# Patient Record
Sex: FEMALE | URBAN - METROPOLITAN AREA
[De-identification: names, ages, dates, MRNs, and addresses within clinical notes are randomized per-mention and may not be internally consistent; named-entity substitution may affect disease eponyms.]

---

## 2023-09-11 ENCOUNTER — APPOINTMENT (OUTPATIENT)
Dept: OTHER | Facility: HOSPITAL | Age: 28
End: 2023-09-11
Attending: PREVENTIVE MEDICINE

## 2023-11-02 ENCOUNTER — OFFICE VISIT (OUTPATIENT)
Dept: FAMILY MEDICINE CLINIC | Facility: CLINIC | Age: 28
End: 2023-11-02
Payer: COMMERCIAL

## 2023-11-02 DIAGNOSIS — E28.2 PCOS (POLYCYSTIC OVARIAN SYNDROME): ICD-10-CM

## 2023-11-02 DIAGNOSIS — E66.01 CLASS 3 SEVERE OBESITY DUE TO EXCESS CALORIES WITHOUT SERIOUS COMORBIDITY WITH BODY MASS INDEX (BMI) OF 45.0 TO 49.9 IN ADULT (HCC): ICD-10-CM

## 2023-11-02 DIAGNOSIS — Z00.00 ROUTINE GENERAL MEDICAL EXAMINATION AT A HEALTH CARE FACILITY: Primary | ICD-10-CM

## 2023-11-02 DIAGNOSIS — Z23 NEED FOR VACCINATION: ICD-10-CM

## 2023-11-02 PROCEDURE — 90471 IMMUNIZATION ADMIN: CPT | Performed by: PHYSICIAN ASSISTANT

## 2023-11-02 PROCEDURE — 90686 IIV4 VACC NO PRSV 0.5 ML IM: CPT | Performed by: PHYSICIAN ASSISTANT

## 2023-11-02 PROCEDURE — 3079F DIAST BP 80-89 MM HG: CPT | Performed by: PHYSICIAN ASSISTANT

## 2023-11-02 PROCEDURE — 3074F SYST BP LT 130 MM HG: CPT | Performed by: PHYSICIAN ASSISTANT

## 2023-11-02 PROCEDURE — 99385 PREV VISIT NEW AGE 18-39: CPT | Performed by: PHYSICIAN ASSISTANT

## 2023-11-02 PROCEDURE — 3008F BODY MASS INDEX DOCD: CPT | Performed by: PHYSICIAN ASSISTANT

## 2023-11-02 NOTE — PROGRESS NOTES
Subjective:   Patient ID: Cesia Narayan is a 33year old female. HPI  Patient presents today to establish care and needs physical.     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 HENT:  Negative for congestion, ear pain, rhinorrhea and sore throat. Eyes:  Negative for visual disturbance. Respiratory:  Negative for cough, shortness of breath and wheezing. Cardiovascular:  Negative for chest pain, palpitations and leg swelling. Gastrointestinal:  Negative for abdominal pain, diarrhea, nausea and vomiting. Genitourinary:  Positive for menstrual problem (PCOS, irregular menses). Negative for dysuria, frequency and hematuria. Musculoskeletal:  Negative for arthralgias, gait problem and myalgias. Skin:  Negative for rash. Neurological:  Negative for weakness, light-headedness and headaches. Hematological:  Negative for adenopathy. Psychiatric/Behavioral:  Negative for dysphoric mood. The patient is not nervous/anxious. No current outpatient medications on file.      Allergies:Not on File    Objective:   Physical Exam  Vitals and nursing note reviewed. Constitutional:       General: She is not in acute distress. Appearance: Normal appearance. She is well-developed. HENT:      Head: Normocephalic and atraumatic. Right Ear: Tympanic membrane, ear canal and external ear normal.      Left Ear: Tympanic membrane, ear canal and external ear normal.      Nose: Nose normal.      Mouth/Throat:      Mouth: Mucous membranes are moist.   Eyes:      Extraocular Movements: Extraocular movements intact. Conjunctiva/sclera: Conjunctivae normal.      Pupils: Pupils are equal, round, and reactive to light. Neck:      Thyroid: No thyromegaly. Cardiovascular:      Rate and Rhythm: Normal rate and regular rhythm. Pulses: Normal pulses. Heart sounds: Normal heart sounds. Pulmonary:      Effort: Pulmonary effort is normal.      Breath sounds: Normal breath sounds. No wheezing or rales. Abdominal:      General: Bowel sounds are normal. There is no distension. Palpations: Abdomen is soft. There is no mass. Tenderness: There is no abdominal tenderness. Musculoskeletal:         General: No tenderness. Normal range of motion. Cervical back: Normal range of motion and neck supple. Lymphadenopathy:      Cervical: No cervical adenopathy. Skin:     General: Skin is warm and dry. Findings: No rash. Neurological:      General: No focal deficit present. Mental Status: She is alert and oriented to person, place, and time. Psychiatric:         Mood and Affect: Mood normal.         Behavior: Behavior normal.         Assessment & Plan:   1. Routine general medical examination at a health care facility  Patient here with active issues as below. Check fasting labs. Health maintenance issues discussed. Encouraged routine vaccines. Advised healthy diet and regular exercise. Annual physicals. - CBC With Differential With Platelet; Future  - Comp Metabolic Panel (14); Future  - Lipid Panel;  Future  - Vitamin B12; Future  - VITAMIN D, 25-HYDROXY [08052][Q]; Future  - HGB A1C [496] [Q]  - TSH and Free T4 [E]  - CBC With Differential With Platelet  - Comp Metabolic Panel (14)  - Lipid Panel  - Vitamin B12  - VITAMIN D, 25-HYDROXY [77132][Q]    2. PCOS (polycystic ovarian syndrome)  Check labs. Follow up pending results to discuss treatment. - TESTOSTERONE (FREE) [30970] [Q]  - TESTOSTERONE TOTAL [873] [Q]  - ESTRADIOL [4021] [Q]  - FSH AND LH [5737] [Q]    3. Need for vaccination  - Fluzone Quadrivalent 6mo and older, 0.5mL    4. Class 3 severe obesity due to excess calories without serious comorbidity with body mass index (BMI) of 45.0 to 49.9 in adult [facility])  Check labs as above. We discussed potentially using GLP-1 or oral medications. Will follow up after labs to discuss options further.

## 2023-11-03 VITALS
BODY MASS INDEX: 48.82 KG/M2 | OXYGEN SATURATION: 98 % | DIASTOLIC BLOOD PRESSURE: 88 MMHG | RESPIRATION RATE: 16 BRPM | WEIGHT: 293 LBS | HEART RATE: 105 BPM | HEIGHT: 65 IN | SYSTOLIC BLOOD PRESSURE: 128 MMHG

## 2023-11-07 LAB
ABSOLUTE BASOPHILS: 27 CELLS/UL (ref 0–200)
ABSOLUTE EOSINOPHILS: 342 CELLS/UL (ref 15–500)
ABSOLUTE LYMPHOCYTES: 3636 CELLS/UL (ref 850–3900)
ABSOLUTE MONOCYTES: 378 CELLS/UL (ref 200–950)
ABSOLUTE NEUTROPHILS: 4617 CELLS/UL (ref 1500–7800)
ALBUMIN/GLOBULIN RATIO: 1.2 (CALC) (ref 1–2.5)
ALBUMIN: 3.9 G/DL (ref 3.6–5.1)
ALBUMIN: 3.9 G/DL (ref 3.6–5.1)
ALKALINE PHOSPHATASE: 88 U/L (ref 31–125)
ALT: 51 U/L (ref 6–29)
AST: 30 U/L (ref 10–30)
BASOPHILS: 0.3 %
BILIRUBIN, TOTAL: 0.4 MG/DL (ref 0.2–1.2)
BUN: 9 MG/DL (ref 7–25)
CALCIUM: 9 MG/DL (ref 8.6–10.2)
CARBON DIOXIDE: 28 MMOL/L (ref 20–32)
CHLORIDE: 104 MMOL/L (ref 98–110)
CHOL/HDLC RATIO: 4.2 (CALC)
CHOLESTEROL, TOTAL: 163 MG/DL
CREATININE: 0.62 MG/DL (ref 0.5–0.96)
EGFR: 124 ML/MIN/1.73M2
EOSINOPHILS: 3.8 %
ESTRADIOL: 85 PG/ML
FSH: 4.8 MIU/ML
GLOBULIN: 3.2 G/DL (CALC) (ref 1.9–3.7)
GLUCOSE: 86 MG/DL (ref 65–99)
HDL CHOLESTEROL: 39 MG/DL
HEMATOCRIT: 41.9 % (ref 35–45)
HEMOGLOBIN A1C: 5.1 % OF TOTAL HGB
HEMOGLOBIN: 13.9 G/DL (ref 11.7–15.5)
LDL-CHOLESTEROL: 93 MG/DL (CALC)
LH: 7.9 MIU/ML
LYMPHOCYTES: 40.4 %
MCH: 28.4 PG (ref 27–33)
MCHC: 33.2 G/DL (ref 32–36)
MCV: 85.5 FL (ref 80–100)
MONOCYTES: 4.2 %
MPV: 10.7 FL (ref 7.5–12.5)
NEUTROPHILS: 51.3 %
NON-HDL CHOLESTEROL: 124 MG/DL (CALC)
PLATELET COUNT: 312 THOUSAND/UL (ref 140–400)
POTASSIUM: 4.3 MMOL/L (ref 3.5–5.3)
PROTEIN, TOTAL: 7.1 G/DL (ref 6.1–8.1)
RDW: 13.1 % (ref 11–15)
RED BLOOD CELL COUNT: 4.9 MILLION/UL (ref 3.8–5.1)
SEX HORMONE BINDING$GLOBULIN: 35.3 NMOL/L (ref 17–124)
SODIUM: 138 MMOL/L (ref 135–146)
T4, FREE: 1.2 NG/DL (ref 0.8–1.8)
TESTOSTERONE, FREE: 4.7 PG/ML (ref 0.2–5)
TESTOSTERONE, TOTAL,$/MS/MS: 41 NG/DL (ref 2–45)
TESTOSTERONE,BIOAVAILABLE: 8.4 NG/DL (ref 0.5–8.5)
TRIGLYCERIDES: 212 MG/DL
TSH: 0.9 MIU/L
VITAMIN B12: 307 PG/ML (ref 200–1100)
VITAMIN D, 25-OH, TOTAL: 7 NG/ML (ref 30–100)
WHITE BLOOD CELL COUNT: 9 THOUSAND/UL (ref 3.8–10.8)

## 2023-11-20 ENCOUNTER — TELEPHONE (OUTPATIENT)
Dept: FAMILY MEDICINE CLINIC | Facility: CLINIC | Age: 28
End: 2023-11-20

## 2023-11-20 NOTE — TELEPHONE ENCOUNTER
Tirzepatide (MOUNJARO) 2.5 MG/0.5ML Subcutaneous Solution Pen-injector       KEY [id number]          FAX IN PA TRIAGE FOLDER

## 2023-12-07 ENCOUNTER — PATIENT MESSAGE (OUTPATIENT)
Dept: FAMILY MEDICINE CLINIC | Facility: CLINIC | Age: 28
End: 2023-12-07

## 2023-12-07 NOTE — TELEPHONE ENCOUNTER
Your PA request has been denied. Additional information will be provided in the denial communication. (Message [phone])   Case ID: [id number]      Payer: [facility]    [phone]   Electronic appeal: Not supported   Your PA request has been denied. Additional information will be provided in the denial communication.  (Message [id number])   View History       PA denied, please advise

## 2023-12-07 NOTE — TELEPHONE ENCOUNTER
PA done on surescripts, await decision    Per 11/15/23 note from Cheryl Noe sent. If not covered. I will discuss orals with her.

## 2023-12-11 RX ORDER — TIRZEPATIDE 2.5 MG/.5ML
2.5 INJECTION, SOLUTION SUBCUTANEOUS WEEKLY
Qty: 2 ML | Refills: 0 | Status: SHIPPED | OUTPATIENT
Start: 2023-12-11

## 2023-12-13 ENCOUNTER — TELEPHONE (OUTPATIENT)
Dept: FAMILY MEDICINE CLINIC | Facility: CLINIC | Age: 28
End: 2023-12-13

## 2023-12-13 NOTE — TELEPHONE ENCOUNTER
Cara,  Covered drugs listed below. Regarding Zepbound.    The patient's drug benefit plan provides coverage for other drugs which may be considered for treating your patient. Can your patient be treated with a formulary drug? Available Formulary Alternatives: orlistat, QSYMIA, SAXENDA, WEGOVY [NOTE: If yes, provide your patient with a new prescription for the formulary product.]

## 2023-12-13 NOTE — TELEPHONE ENCOUNTER
PA needed for Tirzepatide-Weight Management (ZEPBOUND) 2.5 MG/0.5ML Subcutaneous Solution Auto-injector       KEY  [id number]

## 2024-01-18 NOTE — TELEPHONE ENCOUNTER
[facility] sent note that wegovy is on back order and they have no date on when it will become avaialble

## 2024-02-02 ENCOUNTER — TELEPHONE (OUTPATIENT)
Dept: FAMILY MEDICINE CLINIC | Facility: CLINIC | Age: 29
End: 2024-02-02

## 2024-02-02 NOTE — TELEPHONE ENCOUNTER
Patient needs medication sent to a different pharmacy.    She needed to call around to find a pharmacy that had Wegovy in stock.    semaglutide-weight management 0.25 MG/0.5ML Subcutaneous Solution Auto-injector     [facility] - [place], [place] - [address] [place] [address], [address] [phone], [phone]

## 2024-02-26 ENCOUNTER — E-ADVICE (OUTPATIENT)
Dept: BARIATRICS/WEIGHT MGMT | Age: 29
End: 2024-02-26

## 2024-03-07 ENCOUNTER — APPOINTMENT (OUTPATIENT)
Dept: BARIATRICS/WEIGHT MGMT | Age: 29
End: 2024-03-07

## 2024-03-13 ENCOUNTER — TELEPHONE (OUTPATIENT)
Dept: FAMILY MEDICINE CLINIC | Facility: CLINIC | Age: 29
End: 2024-03-13

## 2024-03-13 NOTE — TELEPHONE ENCOUNTER
SURG TBD    PT WILL CALL BACK TO SCHEDULE PREOP WHEN SHE HAS A DATE      FAX IN PREOP ACCORDION FILE

## 2024-04-26 ENCOUNTER — OFFICE VISIT (OUTPATIENT)
Dept: FAMILY MEDICINE CLINIC | Facility: CLINIC | Age: 29
End: 2024-04-26
Payer: COMMERCIAL

## 2024-04-26 VITALS
DIASTOLIC BLOOD PRESSURE: 82 MMHG | WEIGHT: 293 LBS | SYSTOLIC BLOOD PRESSURE: 126 MMHG | HEART RATE: 100 BPM | BODY MASS INDEX: 48.82 KG/M2 | OXYGEN SATURATION: 98 % | RESPIRATION RATE: 18 BRPM | HEIGHT: 65 IN

## 2024-04-26 DIAGNOSIS — E66.01 CLASS 3 SEVERE OBESITY DUE TO EXCESS CALORIES WITH SERIOUS COMORBIDITY AND BODY MASS INDEX (BMI) OF 60.0 TO 69.9 IN ADULT (HCC): ICD-10-CM

## 2024-04-26 DIAGNOSIS — Z01.818 PREOP EXAMINATION: Primary | ICD-10-CM

## 2024-04-26 LAB
ATRIAL RATE: 91 BPM
P AXIS: 29 DEGREES
P-R INTERVAL: 176 MS
Q-T INTERVAL: 376 MS
QRS DURATION: 88 MS
QTC CALCULATION (BEZET): 462 MS
R AXIS: 43 DEGREES
T AXIS: 23 DEGREES
VENTRICULAR RATE: 91 BPM

## 2024-04-26 NOTE — PROGRESS NOTES
Vonda Tesfaye is a 28 year old female who presents for a pre-operative physical exam.   Vonda Tesfaye is scheduled for a Laura-en-Y gastric bypass procedure at [facility] on 4/30/24  performed by Dr Ildefonso Loera. Indication: obesity    HPI related to surgery:   Has been dealing with obesity, and conservative measures haven't led to lasting improvement. Discussed possibility of bariatric surgery, and saw specialist and has opted to proceed with surgery.    She  has had previous anesthesia:  Yes.  Previous complications:  No    Social History     Socioeconomic History    Marital status:    Tobacco Use    Smoking status: Never    Smokeless tobacco: Never   Substance and Sexual Activity    Drug use: Not Currently      No past medical history on file.  Past Surgical History:   Procedure Laterality Date    Cholecystectomy      2021     Allergies: No Known Allergies  No current outpatient medications on file.        REVIEW OF SYSTEMS:   Negative complete ROS    PHYSICAL EXAM:   /82   Pulse 100   Resp 18   Ht 5' 5\" (1.651 m)   Wt (!) 374 lb (169.6 kg)   LMP 01/05/2024   SpO2 98%   BMI 62.24 kg/m²    ENT: PERRLA, EOMI, TM clear  CV: RRR, s1 and s2 present, no murmurs clicks or rubs  Resp: clear to auscultation bilaterally  Abd: BS+, no organomegaly or palpable abnormality  Ext:No edema, distal pulses intact upper and lower bilaterally  Skin:no rashes or lesions      LABORATORY DATA:   EKG - Normal sinus rhythm    ASSESSMENT AND PLAN:   Vonda Tesfaye has no significant history of cardiac or pulmonary conditions.   She is a good surgical candidate. This consult was sent back the referring physician, Dr. Loera.    Assessment:  Encounter Diagnoses   Name Primary?    Preop examination Yes    Class 3 severe obesity due to excess calories with serious comorbidity and body mass index (BMI) of 60.0 to 69.9 in adult (HCC)          Plan   No orders of the defined types were placed in this  encounter.        Robin Molina MD